# Patient Record
Sex: FEMALE | URBAN - METROPOLITAN AREA
[De-identification: names, ages, dates, MRNs, and addresses within clinical notes are randomized per-mention and may not be internally consistent; named-entity substitution may affect disease eponyms.]

---

## 2022-09-08 ENCOUNTER — APPOINTMENT (OUTPATIENT)
Dept: URBAN - METROPOLITAN AREA CLINIC 245 | Age: 52
Setting detail: DERMATOLOGY
End: 2022-09-09

## 2022-09-08 DIAGNOSIS — L65.0 TELOGEN EFFLUVIUM: ICD-10-CM

## 2022-09-08 PROCEDURE — OTHER OTC TREATMENT REGIMEN: OTHER

## 2022-09-08 PROCEDURE — 99213 OFFICE O/P EST LOW 20 MIN: CPT

## 2022-09-08 PROCEDURE — OTHER COUNSELING: OTHER

## 2022-09-08 ASSESSMENT — LOCATION ZONE DERM: LOCATION ZONE: SCALP

## 2022-09-08 ASSESSMENT — LOCATION SIMPLE DESCRIPTION DERM: LOCATION SIMPLE: POSTERIOR SCALP

## 2022-09-08 ASSESSMENT — LOCATION DETAILED DESCRIPTION DERM: LOCATION DETAILED: POSTERIOR MID-PARIETAL SCALP

## 2022-09-08 NOTE — PROCEDURE: COUNSELING
Detail Level: Zone
Patient Specific Counseling (Will Not Stick From Patient To Patient): Patient admits may have had COVID in June 2022

## 2022-09-08 NOTE — PROCEDURE: OTC TREATMENT REGIMEN
Patient Specific Otc Recommendations (Will Not Stick From Patient To Patient): Recommend OTC Nutrafol supplements, take as directed on packaging
Detail Level: Zone

## 2022-09-08 NOTE — HPI: HAIR LOSS
Previous Labs: Yes
How Did The Hair Loss Occur?: sudden in onset
How Severe Is Your Hair Loss?: severe
Additional History: Patient recently had a panel of blood work drawn with results WNL

## 2025-08-26 DIAGNOSIS — Z78.0 ASYMPTOMATIC MENOPAUSAL STATE: Primary | ICD-10-CM

## 2025-08-26 DIAGNOSIS — Z13.820 ENCOUNTER FOR SCREENING FOR OSTEOPOROSIS: ICD-10-CM

## 2025-09-04 ENCOUNTER — HOSPITAL ENCOUNTER (OUTPATIENT)
Age: 55
Discharge: HOME | End: 2025-09-04
Attending: OBSTETRICS & GYNECOLOGY

## 2025-09-04 DIAGNOSIS — Z78.0 ASYMPTOMATIC MENOPAUSAL STATE: ICD-10-CM

## 2025-09-04 DIAGNOSIS — Z13.820 ENCOUNTER FOR SCREENING FOR OSTEOPOROSIS: ICD-10-CM

## 2025-09-04 PROCEDURE — 77080 DXA BONE DENSITY AXIAL: CPT

## 2025-09-05 LAB
DEXA FRACTURE RISK HIP: 0.4
DEXA FRACTURE RISK MAJOR: 3.2
DEXA LT FEMNECK TSCORE: -1.6
DEXA LT FEMNECK ZSCORE: 0.2
DEXA LT HIP FRAX: 0.4
DEXA LT MAJOR OSTEO FRAX: 3.2
DEXA LT TOTFEM TSCORE: -1.3
DEXA LT TOTFEM ZSCORE: 0.2
DEXA RT FEMNECK TSCORE: -1.2
DEXA RT FEMNECK ZSCORE: 0.6
DEXA RT TOTFEM TSCORE: -1
DEXA RT TOTFEM ZSCORE: 0.6
DEXA SPINE L1-L2 T-SCORE: -0.3
DEXA SPINE L1-L2 Z-SCORE: 1.7
DEXA SPINE L1-L3 T-SCORE: 0.2
DEXA SPINE L1-L3 Z-SCORE: 2.2
DEXA SPINE L1-L4 T-SCORE: 0.3
DEXA SPINE L1-L4 Z-SCORE: 2.3
DEXA SPINE L2-L3 T-SCORE: 0.5
DEXA SPINE L2-L3 Z-SCORE: 2.5
DEXA SPINE L2-L4 T-SCORE: 0.5
DEXA SPINE L2-L4 Z-SCORE: 2.5
DEXA SPINE L3-L4 T-SCORE: 0.7
DEXA SPINE L3-L4 Z-SCORE: 2.7